# Patient Record
Sex: MALE | Race: WHITE | NOT HISPANIC OR LATINO | RURAL
[De-identification: names, ages, dates, MRNs, and addresses within clinical notes are randomized per-mention and may not be internally consistent; named-entity substitution may affect disease eponyms.]

---

## 2017-06-09 ENCOUNTER — OFFICE VISIT (OUTPATIENT)
Dept: RETAIL CLINIC | Facility: CLINIC | Age: 22
End: 2017-06-09

## 2017-06-09 DIAGNOSIS — Z13.9 SCREENING: Primary | ICD-10-CM

## 2018-11-01 NOTE — PROGRESS NOTES
Abdirahman Encarnacion is a 21 y.o. male.     Reason for Appointment  1. escreen    History of Present Illness  HPI:   See scanned document. See custody control form.    Assessments  1. Encounter for screening, unspecified - Z13.9    This document has been electronically signed by CURRY Starkey June 9, 2017 6:22 PM        Yes